# Patient Record
Sex: MALE | URBAN - METROPOLITAN AREA
[De-identification: names, ages, dates, MRNs, and addresses within clinical notes are randomized per-mention and may not be internally consistent; named-entity substitution may affect disease eponyms.]

---

## 2020-06-02 ENCOUNTER — LAB OUTSIDE AN ENCOUNTER (OUTPATIENT)
Dept: URBAN - METROPOLITAN AREA CLINIC 105 | Facility: CLINIC | Age: 60
End: 2020-06-02

## 2020-06-18 LAB
C. DIFFICILE TOXIN A/B, STOOL - QDX: NEGATIVE
GASTROINTESTINAL PATHOGEN: (no result)

## 2020-06-26 ENCOUNTER — LAB OUTSIDE AN ENCOUNTER (OUTPATIENT)
Dept: URBAN - METROPOLITAN AREA CLINIC 105 | Facility: CLINIC | Age: 60
End: 2020-06-26

## 2020-06-26 ENCOUNTER — OFFICE VISIT (OUTPATIENT)
Dept: URBAN - METROPOLITAN AREA CLINIC 105 | Facility: CLINIC | Age: 60
End: 2020-06-26
Payer: COMMERCIAL

## 2020-06-26 DIAGNOSIS — A04.72 C. DIFFICILE COLITIS: ICD-10-CM

## 2020-06-26 DIAGNOSIS — D64.9 ANEMIA: ICD-10-CM

## 2020-06-26 DIAGNOSIS — E03.9 HYPOTHYROID: ICD-10-CM

## 2020-06-26 DIAGNOSIS — Z87.19 HISTORY OF PANCREATITIS: ICD-10-CM

## 2020-06-26 DIAGNOSIS — E46 PROTEIN CALORIE MALNUTRITION: ICD-10-CM

## 2020-06-26 PROCEDURE — 82607 VITAMIN B-12: CPT | Performed by: INTERNAL MEDICINE

## 2020-06-26 PROCEDURE — G8427 DOCREV CUR MEDS BY ELIG CLIN: HCPCS | Performed by: INTERNAL MEDICINE

## 2020-06-26 PROCEDURE — 3017F COLORECTAL CA SCREEN DOC REV: CPT | Performed by: INTERNAL MEDICINE

## 2020-06-26 PROCEDURE — G8420 CALC BMI NORM PARAMETERS: HCPCS | Performed by: INTERNAL MEDICINE

## 2020-06-26 PROCEDURE — G9903 PT SCRN TBCO ID AS NON USER: HCPCS | Performed by: INTERNAL MEDICINE

## 2020-06-26 PROCEDURE — 99214 OFFICE O/P EST MOD 30 MIN: CPT | Performed by: INTERNAL MEDICINE

## 2020-06-26 RX ORDER — FERROUS FUMARATE 324(106)MG
TAKE 1 TABLET BY ORAL ROUTE 3 TIMES A DAY TABLET ORAL
Qty: 0 | Refills: 0 | Status: ACTIVE | COMMUNITY
Start: 1900-01-01

## 2020-06-26 RX ORDER — ASPIRIN 325 MG/1
TAKE 1 TABLET (325 MG) BY ORAL ROUTE ONCE DAILY TABLET ORAL 1
Qty: 0 | Refills: 0 | Status: ACTIVE | COMMUNITY
Start: 1900-01-01

## 2020-06-26 RX ORDER — INSULIN ASPART 100 [IU]/ML
AS DIRECTED INJECTION, SOLUTION INTRAVENOUS; SUBCUTANEOUS
Status: ACTIVE | COMMUNITY

## 2020-06-26 RX ORDER — METFORMIN HYDROCHLORIDE 500 MG/1
TAKE 1 TABLET (500 MG) BY ORAL ROUTE 2 TIMES PER DAY WITH MORNING AND EVENING MEALS TABLET, COATED ORAL 2
Qty: 0 | Refills: 0 | Status: ACTIVE | COMMUNITY
Start: 1900-01-01

## 2020-06-26 RX ORDER — TAMSULOSIN HYDROCHLORIDE 0.4 MG/1
CAPSULE ORAL
Qty: 0 | Refills: 0 | Status: ACTIVE | COMMUNITY
Start: 1900-01-01

## 2020-06-26 RX ORDER — ALBUTEROL SULFATE 0.63 MG/3ML
USE IN NEBULIZER AS DIRECTED SOLUTION INTRABRONCHIAL
Qty: 1 | Refills: 0 | Status: ACTIVE | COMMUNITY
Start: 1900-01-01

## 2020-06-26 RX ORDER — PRAVASTATIN SODIUM 20 MG/1
TAKE 1 TABLET (20 MG) BY ORAL ROUTE ONCE DAILY TABLET ORAL 1
Qty: 0 | Refills: 0 | Status: DISCONTINUED | COMMUNITY
Start: 1900-01-01

## 2020-06-26 RX ORDER — CHLORDIAZEPOXIDE HCL 10 MG
TAKE 1 CAPSULE (10 MG) BY ORAL ROUTE 3 TIMES PER DAY CAPSULE ORAL
Qty: 0 | Refills: 0 | Status: ACTIVE | COMMUNITY
Start: 1900-01-01

## 2020-06-26 RX ORDER — VENLAFAXINE HYDROCHLORIDE 100 MG/1
1 TABLET WITH FOOD TABLET ORAL ONCE A DAY
Status: ACTIVE | COMMUNITY

## 2020-06-26 RX ORDER — ATORVASTATIN CALCIUM 20 MG/1
1 TABLET TABLET, FILM COATED ORAL ONCE A DAY
Status: ACTIVE | COMMUNITY

## 2020-06-26 RX ORDER — CHOLECALCIFEROL (VITAMIN D3) 1250 MCG
1 CAPSULE CAPSULE ORAL
Status: ACTIVE | COMMUNITY

## 2020-06-26 RX ORDER — TRAZODONE HYDROCHLORIDE 100 MG/1
1 TABLET AT BEDTIME TABLET, FILM COATED ORAL ONCE A DAY
Status: ACTIVE | COMMUNITY

## 2020-06-26 RX ORDER — LISINOPRIL 20 MG/1
TAKE 1 TABLET (20 MG) BY ORAL ROUTE ONCE DAILY TABLET ORAL 1
Qty: 0 | Refills: 0 | Status: ACTIVE | COMMUNITY
Start: 1900-01-01

## 2020-06-26 RX ORDER — MIRTAZAPINE 7.5 MG/1
2 TABLETS AT BEDTIME TABLET, FILM COATED ORAL ONCE A DAY
Status: ACTIVE | COMMUNITY

## 2020-06-26 RX ORDER — ABACAVIR SULFATE, LAMIVUDINE, AND ZIDOVUDINE 300; 150; 300 MG/1; MG/1; MG/1
TAKE 1 TABLET BY ORAL ROUTE 2 TIMES PER DAY TABLET, FILM COATED ORAL 2
Qty: 0 | Refills: 0 | Status: ACTIVE | COMMUNITY
Start: 1900-01-01

## 2020-06-26 RX ORDER — LEVOTHYROXINE SODIUM 0.2 MG/1
TAKE 1 TABLET (200 MCG) BY ORAL ROUTE ONCE DAILY TABLET ORAL 1
Qty: 0 | Refills: 0 | Status: DISCONTINUED | COMMUNITY
Start: 1900-01-01

## 2020-06-26 RX ORDER — OXYCODONE HYDROCHLORIDE 10 MG/1
1 TABLET AS NEEDED TABLET ORAL
Status: ACTIVE | COMMUNITY

## 2020-06-26 RX ORDER — SACCHAROMYCES BOULARDII 250 MG
1 CAPSULE CAPSULE ORAL TWICE A DAY
Status: ACTIVE | COMMUNITY

## 2020-06-26 RX ORDER — SIMETHICONE 80 MG/1
1 TABLET AFTER MEALS AND AT BEDTIME AS NEEDED TABLET, CHEWABLE ORAL
Status: ACTIVE | COMMUNITY

## 2020-06-26 RX ORDER — INSULIN LISPRO 100 [IU]/ML
INJECT 10 UNITS BY SUBCUTANEOUS ROUTE INJECTION, SOLUTION INTRAVENOUS; SUBCUTANEOUS 1
Qty: 1 | Refills: 0 | Status: DISCONTINUED | COMMUNITY
Start: 1900-01-01

## 2020-06-26 RX ORDER — INSULIN GLARGINE 100 [IU]/ML
INJECTION, SOLUTION SUBCUTANEOUS
Qty: 0 | Refills: 0 | Status: DISCONTINUED | COMMUNITY
Start: 1900-01-01

## 2020-06-26 RX ORDER — DIPHENHYDRAMINE HYDROCHLORIDE 25 MG/1
TAKE 2 AT BEDTIME TABLET, FILM COATED ORAL
Qty: 0 | Refills: 0 | Status: ACTIVE | COMMUNITY
Start: 1900-01-01

## 2020-06-26 RX ORDER — MAG HYDROX/ALUMINUM HYD/SIMETH 400-400-40
10 ML AS NEEDED SUSPENSION, ORAL (FINAL DOSE FORM) ORAL
Status: ACTIVE | COMMUNITY

## 2020-06-26 RX ORDER — AMLODIPINE BESYLATE 5 MG/1
1 TABLET TABLET ORAL ONCE A DAY
Status: DISCONTINUED | COMMUNITY

## 2020-06-26 RX ORDER — INSULIN DETEMIR 100 [IU]/ML
AS DIRECTED INJECTION, SOLUTION SUBCUTANEOUS
Status: ACTIVE | COMMUNITY

## 2020-06-26 RX ORDER — GLIMEPIRIDE 4 MG/1
1 TABLET WITH BREAKFAST OR THE FIRST MAIN MEAL OF THE DAY TABLET ORAL ONCE A DAY
Status: ACTIVE | COMMUNITY

## 2020-06-26 RX ORDER — IBUPROFEN 600 MG/1
1 TABLET WITH FOOD OR MILK AS NEEDED TABLET ORAL THREE TIMES A DAY
Status: ACTIVE | COMMUNITY

## 2020-06-26 RX ORDER — PROMETHAZINE HYDROCHLORIDE 12.5 MG/1
1 TABLET AS NEEDED TABLET ORAL
Qty: 90 TABLET | Status: ACTIVE | COMMUNITY

## 2020-06-26 RX ORDER — LEVOTHYROXINE SODIUM 137 UG/1
1 TABLET IN THE MORNING ON AN EMPTY STOMACH TABLET ORAL ONCE A DAY
Status: ACTIVE | COMMUNITY

## 2020-06-26 RX ORDER — LAMIVUDINE AND ZIDOVUDINE 150; 300 MG/1; MG/1
AS DIRECTED TABLET, FILM COATED ORAL
Status: ACTIVE | COMMUNITY

## 2020-06-26 RX ORDER — GABAPENTIN 300 MG/1
TAKE 1 CAPSULE (300 MG) BY ORAL ROUTE 3 TIMES PER DAY CAPSULE ORAL
Qty: 0 | Refills: 0 | Status: ACTIVE | COMMUNITY
Start: 1900-01-01

## 2020-06-26 RX ORDER — HYDROXYZINE PAMOATE 25 MG/1
TAKE 1 CAPSULE BY ORAL ROUTE ONCE A DAY (AT BEDTIME) CAPSULE ORAL 1
Qty: 0 | Refills: 0 | Status: DISCONTINUED | COMMUNITY
Start: 1900-01-01

## 2020-06-26 RX ORDER — ABACAVIR SULFATE 300 MG/1
1 TABLET TABLET, FILM COATED ORAL TWICE A DAY
Status: ACTIVE | COMMUNITY

## 2020-06-26 NOTE — HPI-OTHER HISTORIES
The patient presents in follow-up after presenting 3/3/20 with left-sided abdominal pain and diarrhea w/ fecal incontinence. He was diagnosed with C diff colitis and tx'ed with vancomycin x 2 weeks. He was suppose to follow-up later in March, but I am seeing him for the first time today in follow-up.  On 3/3/20, the patient presented for a "colon infection." Associated symptoms were left-sided abdominal pain and diarrhea with fecal incontinence. Onset of diarrhea was 3-6 months ago. He had 3-5 BMs/day. They were watery unless he took a "stool hardener" 2 pills QHS. He denied bleeding and weight loss.   His last colon was 20 years ago, he stated. He also reported a kidney infection. He noted seeing Dr. Jhon Troy at [facility]. His HIV doctor was George Ko at [facility] (next appt was on Wed).   Physician Consult Note from [facility] 6/26/19 - note no diarrhea.  He had a history of alcohol indued pancreatitis/partial pancreatectomy and former polysubstance abuse.  3/4/20 GPP stool study positive for C diff by Toxin A/B gene and EIA Toxin A/B negative.  He was treated with vancomycin 125 mg QID x 2 weeks and he notes his diarrhea resolved.  On 5/12/20, he stated he had recurrent diarrhea starting 1 week ago.  He was having 2 watery BMs/day.  Today, he says he has 2-3 hard or watery BMs/day without straining. The vancomycin 125 mg has significantly improved his bowel habit. He no longer has incontinence. He reports intermittent LE edema. He also reports excessive sweating.   Labs 6/2/20 - Stool study positive for C. diff, neg for C. dif a/b EIA. 5/28/20 - CBC normal except hgb 8.1, . CMP, iron/TIBC, ferritin, TSH/FT4, vit B12, folate all normal. 3/2/20 - TSH 52.6/elevated, FT4 0.70 LLN 0.82, WBC 15, Hgb 8.3, MCV 97/normal, Creat 1.76, K 6.0, Albumin 3.0, ttG negative, lipase 3, IgA normal.

## 2020-06-29 LAB
A/G RATIO: 0.9
ALBUMIN: 3.2
ALKALINE PHOSPHATASE: 80
ALT (SGPT): 27
AST (SGOT): 45
BASO (ABSOLUTE): 0
BASOS: 0
BILIRUBIN, TOTAL: <0.2
BUN/CREATININE RATIO: 5
BUN: 9
CALCIUM: 8.2
CARBON DIOXIDE, TOTAL: 23
CHLORIDE: 100
CREATININE: 1.75
EGFR IF AFRICN AM: 48
EGFR IF NONAFRICN AM: 41
EOS (ABSOLUTE): 0.3
EOS: 4
FERRITIN, SERUM: 440
FOLATE (FOLIC ACID), SERUM: >20
GLOBULIN, TOTAL: 3.7
GLUCOSE: 82
HEMATOCRIT: 24.8
HEMATOLOGY COMMENTS:: (no result)
HEMOGLOBIN: 8.3
IMMATURE CELLS: (no result)
IMMATURE GRANS (ABS): 0
IMMATURE GRANULOCYTES: 0
IRON BIND.CAP.(TIBC): 229
IRON SATURATION: 29
IRON: 66
LYMPHS (ABSOLUTE): 2.9
LYMPHS: 42
MCH: 33.2
MCHC: 33.5
MCV: 99
MONOCYTES(ABSOLUTE): 0.6
MONOCYTES: 9
NEUTROPHILS (ABSOLUTE): 3.1
NEUTROPHILS: 45
NRBC: (no result)
PLATELETS: 451
POTASSIUM: 5.3
PROTEIN, TOTAL: 6.9
RBC: 2.5
RDW: 15.5
SODIUM: 134
T4,FREE(DIRECT): 0.83
TSH: 105
UIBC: 163
VITAMIN B12: 474
WBC: 6.9

## 2020-07-07 ENCOUNTER — OUT OF OFFICE VISIT (OUTPATIENT)
Dept: URBAN - METROPOLITAN AREA MEDICAL CENTER 28 | Facility: MEDICAL CENTER | Age: 60
End: 2020-07-07
Payer: COMMERCIAL

## 2020-07-07 DIAGNOSIS — Z87.19 H/O SMALL BOWEL OBSTRUCTION: ICD-10-CM

## 2020-07-07 DIAGNOSIS — E11.9 ALTERED GLUCOSE METABOLISM DUE TO DIABETES: ICD-10-CM

## 2020-07-07 DIAGNOSIS — Z21 HIV POSITIVE: ICD-10-CM

## 2020-07-07 DIAGNOSIS — K62.5 ANAL BLEEDING: ICD-10-CM

## 2020-07-07 PROCEDURE — 99214 OFFICE O/P EST MOD 30 MIN: CPT | Performed by: PHYSICIAN ASSISTANT

## 2020-07-09 ENCOUNTER — OUT OF OFFICE VISIT (OUTPATIENT)
Dept: URBAN - METROPOLITAN AREA MEDICAL CENTER 28 | Facility: MEDICAL CENTER | Age: 60
End: 2020-07-09
Payer: COMMERCIAL

## 2020-07-09 ENCOUNTER — OUT OF OFFICE VISIT (OUTPATIENT)
Dept: URBAN - METROPOLITAN AREA MEDICAL CENTER 28 | Facility: MEDICAL CENTER | Age: 60
End: 2020-07-09

## 2020-07-09 DIAGNOSIS — D50.8 IRON DEFICIENCY ANEMIA DUE TO DIETARY CAUSES: ICD-10-CM

## 2020-07-09 DIAGNOSIS — K62.5 ANAL BLEEDING: ICD-10-CM

## 2020-07-09 PROCEDURE — G9937 DIG OR SURV COLSCO: HCPCS | Performed by: INTERNAL MEDICINE

## 2020-07-09 PROCEDURE — 45378 DIAGNOSTIC COLONOSCOPY: CPT | Performed by: INTERNAL MEDICINE

## 2020-07-09 PROCEDURE — 992 APS NON BILLABLE: Performed by: INTERNAL MEDICINE

## 2020-07-23 ENCOUNTER — OFFICE VISIT (OUTPATIENT)
Dept: URBAN - METROPOLITAN AREA CLINIC 105 | Facility: CLINIC | Age: 60
End: 2020-07-23
Payer: COMMERCIAL

## 2020-07-23 DIAGNOSIS — N18.9 CHRONIC KIDNEY DISEASE, UNSPECIFIED CKD STAGE: ICD-10-CM

## 2020-07-23 DIAGNOSIS — E03.9 HYPOTHYROID: ICD-10-CM

## 2020-07-23 DIAGNOSIS — E46 PROTEIN CALORIE MALNUTRITION: ICD-10-CM

## 2020-07-23 DIAGNOSIS — A04.72 C. DIFFICILE COLITIS: ICD-10-CM

## 2020-07-23 DIAGNOSIS — D64.9 ANEMIA: ICD-10-CM

## 2020-07-23 DIAGNOSIS — Z87.19 HISTORY OF PANCREATITIS: ICD-10-CM

## 2020-07-23 PROCEDURE — G9902 PT SCRN TBCO AND ID AS USER: HCPCS | Performed by: INTERNAL MEDICINE

## 2020-07-23 PROCEDURE — G8420 CALC BMI NORM PARAMETERS: HCPCS | Performed by: INTERNAL MEDICINE

## 2020-07-23 PROCEDURE — G8427 DOCREV CUR MEDS BY ELIG CLIN: HCPCS | Performed by: INTERNAL MEDICINE

## 2020-07-23 PROCEDURE — 99213 OFFICE O/P EST LOW 20 MIN: CPT | Performed by: INTERNAL MEDICINE

## 2020-07-23 PROCEDURE — 3017F COLORECTAL CA SCREEN DOC REV: CPT | Performed by: INTERNAL MEDICINE

## 2020-07-23 RX ORDER — SACCHAROMYCES BOULARDII 250 MG
1 CAPSULE CAPSULE ORAL TWICE A DAY
Status: ACTIVE | COMMUNITY

## 2020-07-23 RX ORDER — IBUPROFEN 600 MG/1
1 TABLET WITH FOOD OR MILK AS NEEDED TABLET ORAL THREE TIMES A DAY
Status: ACTIVE | COMMUNITY

## 2020-07-23 RX ORDER — DIPHENHYDRAMINE HYDROCHLORIDE 25 MG/1
TAKE 2 AT BEDTIME TABLET, FILM COATED ORAL
Qty: 0 | Refills: 0 | Status: ACTIVE | COMMUNITY
Start: 1900-01-01

## 2020-07-23 RX ORDER — ABACAVIR SULFATE 300 MG/1
1 TABLET TABLET, FILM COATED ORAL TWICE A DAY
Status: ACTIVE | COMMUNITY

## 2020-07-23 RX ORDER — CHLORDIAZEPOXIDE HCL 10 MG
TAKE 1 CAPSULE (10 MG) BY ORAL ROUTE 3 TIMES PER DAY CAPSULE ORAL
Qty: 0 | Refills: 0 | Status: ACTIVE | COMMUNITY
Start: 1900-01-01

## 2020-07-23 RX ORDER — VENLAFAXINE HYDROCHLORIDE 100 MG/1
1 TABLET WITH FOOD TABLET ORAL ONCE A DAY
Status: ACTIVE | COMMUNITY

## 2020-07-23 RX ORDER — TRAZODONE HYDROCHLORIDE 100 MG/1
1 TABLET AT BEDTIME TABLET, FILM COATED ORAL ONCE A DAY
Status: ACTIVE | COMMUNITY

## 2020-07-23 RX ORDER — LEVOTHYROXINE SODIUM 137 UG/1
1 TABLET IN THE MORNING ON AN EMPTY STOMACH TABLET ORAL ONCE A DAY
Status: ACTIVE | COMMUNITY

## 2020-07-23 RX ORDER — CHOLECALCIFEROL (VITAMIN D3) 1250 MCG
1 CAPSULE CAPSULE ORAL
Status: ACTIVE | COMMUNITY

## 2020-07-23 RX ORDER — METFORMIN HYDROCHLORIDE 500 MG/1
TAKE 1 TABLET (500 MG) BY ORAL ROUTE 2 TIMES PER DAY WITH MORNING AND EVENING MEALS TABLET, COATED ORAL 2
Qty: 0 | Refills: 0 | Status: ACTIVE | COMMUNITY
Start: 1900-01-01

## 2020-07-23 RX ORDER — TAMSULOSIN HYDROCHLORIDE 0.4 MG/1
CAPSULE ORAL
Qty: 0 | Refills: 0 | Status: ACTIVE | COMMUNITY
Start: 1900-01-01

## 2020-07-23 RX ORDER — ALBUTEROL SULFATE 0.63 MG/3ML
USE IN NEBULIZER AS DIRECTED SOLUTION INTRABRONCHIAL
Qty: 1 | Refills: 0 | Status: ACTIVE | COMMUNITY
Start: 1900-01-01

## 2020-07-23 RX ORDER — GLIMEPIRIDE 4 MG/1
1 TABLET WITH BREAKFAST OR THE FIRST MAIN MEAL OF THE DAY TABLET ORAL ONCE A DAY
Status: ACTIVE | COMMUNITY

## 2020-07-23 RX ORDER — LISINOPRIL 20 MG/1
TAKE 1 TABLET (20 MG) BY ORAL ROUTE ONCE DAILY TABLET ORAL 1
Qty: 0 | Refills: 0 | Status: ACTIVE | COMMUNITY
Start: 1900-01-01

## 2020-07-23 RX ORDER — LAMIVUDINE AND ZIDOVUDINE 150; 300 MG/1; MG/1
AS DIRECTED TABLET, FILM COATED ORAL
Status: ACTIVE | COMMUNITY

## 2020-07-23 RX ORDER — PROMETHAZINE HYDROCHLORIDE 12.5 MG/1
1 TABLET AS NEEDED TABLET ORAL
Qty: 90 TABLET | Status: ACTIVE | COMMUNITY

## 2020-07-23 RX ORDER — SIMETHICONE 80 MG/1
1 TABLET AFTER MEALS AND AT BEDTIME AS NEEDED TABLET, CHEWABLE ORAL
Status: ACTIVE | COMMUNITY

## 2020-07-23 RX ORDER — MAG HYDROX/ALUMINUM HYD/SIMETH 400-400-40
10 ML AS NEEDED SUSPENSION, ORAL (FINAL DOSE FORM) ORAL
Status: ACTIVE | COMMUNITY

## 2020-07-23 RX ORDER — INSULIN ASPART 100 [IU]/ML
AS DIRECTED INJECTION, SOLUTION INTRAVENOUS; SUBCUTANEOUS
Status: ACTIVE | COMMUNITY

## 2020-07-23 RX ORDER — GABAPENTIN 300 MG/1
TAKE 1 CAPSULE (300 MG) BY ORAL ROUTE 3 TIMES PER DAY CAPSULE ORAL
Qty: 0 | Refills: 0 | Status: ACTIVE | COMMUNITY
Start: 1900-01-01

## 2020-07-23 RX ORDER — ABACAVIR SULFATE, LAMIVUDINE, AND ZIDOVUDINE 300; 150; 300 MG/1; MG/1; MG/1
TAKE 1 TABLET BY ORAL ROUTE 2 TIMES PER DAY TABLET, FILM COATED ORAL 2
Qty: 0 | Refills: 0 | Status: ACTIVE | COMMUNITY
Start: 1900-01-01

## 2020-07-23 RX ORDER — MIRTAZAPINE 7.5 MG/1
2 TABLETS AT BEDTIME TABLET, FILM COATED ORAL ONCE A DAY
Status: ACTIVE | COMMUNITY

## 2020-07-23 RX ORDER — OXYCODONE HYDROCHLORIDE 10 MG/1
1 TABLET AS NEEDED TABLET ORAL
Status: ACTIVE | COMMUNITY

## 2020-07-23 RX ORDER — ASPIRIN 325 MG/1
TAKE 1 TABLET (325 MG) BY ORAL ROUTE ONCE DAILY TABLET ORAL 1
Qty: 0 | Refills: 0 | Status: ACTIVE | COMMUNITY
Start: 1900-01-01

## 2020-07-23 RX ORDER — INSULIN DETEMIR 100 [IU]/ML
AS DIRECTED INJECTION, SOLUTION SUBCUTANEOUS
Status: ACTIVE | COMMUNITY

## 2020-07-23 RX ORDER — FERROUS FUMARATE 324(106)MG
TAKE 1 TABLET BY ORAL ROUTE 3 TIMES A DAY TABLET ORAL
Qty: 0 | Refills: 0 | Status: ACTIVE | COMMUNITY
Start: 1900-01-01

## 2020-07-23 RX ORDER — ATORVASTATIN CALCIUM 20 MG/1
1 TABLET TABLET, FILM COATED ORAL ONCE A DAY
Status: ACTIVE | COMMUNITY

## 2020-07-23 NOTE — HPI-OTHER HISTORIES
The patient presents in follow-up after presenting 3/3/20 with left-sided abdominal pain and diarrhea w/ fecal incontinence. He was diagnosed with C diff colitis and tx'ed with vancomycin x 2 weeks. He was suppose to follow-up later in March, but I am seeing him for the first time today in follow-up.  On 3/3/20, the patient presented for a "colon infection." Associated symptoms were left-sided abdominal pain and diarrhea with fecal incontinence. Onset of diarrhea was 3-6 months ago. He had 3-5 BMs/day. They were watery unless he took a "stool hardener" 2 pills QHS. He denied bleeding and weight loss.   His last colon was 20 years ago, he stated. He also reported a kidney infection. He noted seeing Dr. Jhon Troy at [facility]. His HIV doctor was George Ko at [facility] (next appt was on Wed).   Physician Consult Note from [facility] 6/26/19 - note no diarrhea.  He had a history of alcohol indued pancreatitis/partial pancreatectomy and former polysubstance abuse.  3/4/20 GPP stool study positive for C diff by Toxin A/B gene and EIA Toxin A/B negative.  He was treated with vancomycin 125 mg QID x 2 weeks and he notes his diarrhea resolved.  On 5/12/20, he stated he had recurrent diarrhea starting 1 week ago.  He was having 2 watery BMs/day.  On 6/26/20, he said he had 2-3 hard or watery BMs/day without straining. The vancomycin 125 mg had significantly improved his bowel habit. He no longer had incontinence. He reported intermittent LE edema. He also reported excessive sweating.   Today, he says he has 2 BMs/day without blood. A colon was done at [facility] he states while he was a recent inpatient. It was normal he states. He continues on vancomycin 125 mg QD. He continues on Creon with meals and snacks.   ADDENDUM 7/24/20:  [facility] D/C summary from admit from 7/6/20-7/10/20 reviewed.  Admitted with BRBPR and iron deficiency anemia.  B12 and folic acid normal.  Hgb 7.1 to 8.4 on admit and increased to 10.9 with 1 unit blood.  Continued on iron sulfate.  7/6/20 CT A/P w/o contrast - right lower lung opacities - inflammatory or infectious process.  Moderate stool in the colon.  Mild bladder wall thickening.  Extensive anasarca.  CXR 7/6/20 with right lung base atelectasis.  Trace left pleural effusion.  7/9/20 Colonoscopy by Dr. Parry - ascending DD, brown stool in the entire colon - fair prep.  No other finding.  Unable to retroflex in the vault - too small.  Labs 6/26/20 - CBC normal except hgb 8.3, MCV 99, platelets 451. CMP normal except creatinine 1.75, potassium 5.3, albumin 3.2, AST 45, GFR 48. Ferritin 440, TIBC 229, . Folate, vit B12 all normal. 6/2/20 - Stool study positive for C. diff, neg for C. dif a/b EIA. 5/28/20 - CBC normal except hgb 8.1, . CMP, iron/TIBC, ferritin, TSH/FT4, vit B12, folate all normal. 3/2/20 - TSH 52.6/elevated, FT4 0.70 LLN 0.82, WBC 15, Hgb 8.3, MCV 97/normal, Creat 1.76, K 6.0, Albumin 3.0, ttG negative, lipase 3, IgA normal.

## 2020-08-20 ENCOUNTER — LAB OUTSIDE AN ENCOUNTER (OUTPATIENT)
Dept: URBAN - METROPOLITAN AREA CLINIC 105 | Facility: CLINIC | Age: 60
End: 2020-08-20

## 2020-08-20 ENCOUNTER — OFFICE VISIT (OUTPATIENT)
Dept: URBAN - METROPOLITAN AREA CLINIC 105 | Facility: CLINIC | Age: 60
End: 2020-08-20
Payer: COMMERCIAL

## 2020-08-20 DIAGNOSIS — E46 PROTEIN CALORIE MALNUTRITION: ICD-10-CM

## 2020-08-20 DIAGNOSIS — A04.72 C. DIFFICILE COLITIS: ICD-10-CM

## 2020-08-20 DIAGNOSIS — D64.9 ANEMIA: ICD-10-CM

## 2020-08-20 DIAGNOSIS — N18.9 CHRONIC KIDNEY DISEASE, UNSPECIFIED CKD STAGE: ICD-10-CM

## 2020-08-20 DIAGNOSIS — E03.9 HYPOTHYROID: ICD-10-CM

## 2020-08-20 DIAGNOSIS — Z87.19 HISTORY OF PANCREATITIS: ICD-10-CM

## 2020-08-20 PROCEDURE — 3017F COLORECTAL CA SCREEN DOC REV: CPT | Performed by: INTERNAL MEDICINE

## 2020-08-20 PROCEDURE — G8427 DOCREV CUR MEDS BY ELIG CLIN: HCPCS | Performed by: INTERNAL MEDICINE

## 2020-08-20 PROCEDURE — G9903 PT SCRN TBCO ID AS NON USER: HCPCS | Performed by: INTERNAL MEDICINE

## 2020-08-20 PROCEDURE — 99214 OFFICE O/P EST MOD 30 MIN: CPT | Performed by: INTERNAL MEDICINE

## 2020-08-20 PROCEDURE — G8420 CALC BMI NORM PARAMETERS: HCPCS | Performed by: INTERNAL MEDICINE

## 2020-08-20 RX ORDER — LEVOTHYROXINE SODIUM 137 UG/1
1 TABLET IN THE MORNING ON AN EMPTY STOMACH TABLET ORAL ONCE A DAY
Status: ACTIVE | COMMUNITY

## 2020-08-20 RX ORDER — PROMETHAZINE HYDROCHLORIDE 12.5 MG/1
1 TABLET AS NEEDED TABLET ORAL
Qty: 90 TABLET | Status: ACTIVE | COMMUNITY

## 2020-08-20 RX ORDER — VENLAFAXINE HYDROCHLORIDE 100 MG/1
1 TABLET WITH FOOD TABLET ORAL ONCE A DAY
Status: ACTIVE | COMMUNITY

## 2020-08-20 RX ORDER — MIRTAZAPINE 7.5 MG/1
2 TABLETS AT BEDTIME TABLET, FILM COATED ORAL ONCE A DAY
Status: ACTIVE | COMMUNITY

## 2020-08-20 RX ORDER — METFORMIN HYDROCHLORIDE 500 MG/1
TAKE 1 TABLET (500 MG) BY ORAL ROUTE 2 TIMES PER DAY WITH MORNING AND EVENING MEALS TABLET, COATED ORAL 2
Qty: 0 | Refills: 0 | Status: DISCONTINUED | COMMUNITY
Start: 1900-01-01

## 2020-08-20 RX ORDER — ALBUTEROL SULFATE 0.63 MG/3ML
USE IN NEBULIZER AS DIRECTED SOLUTION INTRABRONCHIAL
Qty: 1 | Refills: 0 | Status: DISCONTINUED | COMMUNITY
Start: 1900-01-01

## 2020-08-20 RX ORDER — SIMETHICONE 80 MG/1
1 TABLET AFTER MEALS AND AT BEDTIME AS NEEDED TABLET, CHEWABLE ORAL
Status: ACTIVE | COMMUNITY

## 2020-08-20 RX ORDER — ABACAVIR SULFATE 300 MG/1
1 TABLET TABLET, FILM COATED ORAL TWICE A DAY
Status: DISCONTINUED | COMMUNITY

## 2020-08-20 RX ORDER — TRAZODONE HYDROCHLORIDE 100 MG/1
1 TABLET AT BEDTIME TABLET, FILM COATED ORAL ONCE A DAY
Status: ACTIVE | COMMUNITY

## 2020-08-20 RX ORDER — DIPHENHYDRAMINE HYDROCHLORIDE 25 MG/1
TAKE 2 AT BEDTIME TABLET, FILM COATED ORAL
Qty: 0 | Refills: 0 | Status: DISCONTINUED | COMMUNITY
Start: 1900-01-01

## 2020-08-20 RX ORDER — INSULIN ASPART 100 [IU]/ML
AS DIRECTED INJECTION, SOLUTION INTRAVENOUS; SUBCUTANEOUS
Status: ACTIVE | COMMUNITY

## 2020-08-20 RX ORDER — ASPIRIN 325 MG/1
TAKE 1 TABLET (325 MG) BY ORAL ROUTE ONCE DAILY TABLET ORAL 1
Qty: 0 | Refills: 0 | Status: DISCONTINUED | COMMUNITY
Start: 1900-01-01

## 2020-08-20 RX ORDER — TAMSULOSIN HYDROCHLORIDE 0.4 MG/1
CAPSULE ORAL
Qty: 0 | Refills: 0 | Status: ACTIVE | COMMUNITY
Start: 1900-01-01

## 2020-08-20 RX ORDER — OXYCODONE HYDROCHLORIDE 10 MG/1
1 TABLET AS NEEDED TABLET ORAL
Status: ACTIVE | COMMUNITY

## 2020-08-20 RX ORDER — SACCHAROMYCES BOULARDII 250 MG
1 CAPSULE CAPSULE ORAL TWICE A DAY
Status: DISCONTINUED | COMMUNITY

## 2020-08-20 RX ORDER — LAMIVUDINE AND ZIDOVUDINE 150; 300 MG/1; MG/1
AS DIRECTED TABLET, FILM COATED ORAL
Status: ACTIVE | COMMUNITY

## 2020-08-20 RX ORDER — GLIMEPIRIDE 4 MG/1
1 TABLET WITH BREAKFAST OR THE FIRST MAIN MEAL OF THE DAY TABLET ORAL ONCE A DAY
Status: DISCONTINUED | COMMUNITY

## 2020-08-20 RX ORDER — LISINOPRIL 20 MG/1
TAKE 1 TABLET (20 MG) BY ORAL ROUTE ONCE DAILY TABLET ORAL 1
Qty: 0 | Refills: 0 | Status: ACTIVE | COMMUNITY
Start: 1900-01-01

## 2020-08-20 RX ORDER — IBUPROFEN 600 MG/1
1 TABLET WITH FOOD OR MILK AS NEEDED TABLET ORAL THREE TIMES A DAY
Status: DISCONTINUED | COMMUNITY

## 2020-08-20 RX ORDER — MAG HYDROX/ALUMINUM HYD/SIMETH 400-400-40
10 ML AS NEEDED SUSPENSION, ORAL (FINAL DOSE FORM) ORAL
Status: DISCONTINUED | COMMUNITY

## 2020-08-20 RX ORDER — CHLORDIAZEPOXIDE HCL 10 MG
TAKE 1 CAPSULE (10 MG) BY ORAL ROUTE 3 TIMES PER DAY CAPSULE ORAL
Qty: 0 | Refills: 0 | Status: ACTIVE | COMMUNITY
Start: 1900-01-01

## 2020-08-20 RX ORDER — FERROUS FUMARATE 324(106)MG
TAKE 1 TABLET BY ORAL ROUTE 3 TIMES A DAY TABLET ORAL
Qty: 0 | Refills: 0 | Status: ACTIVE | COMMUNITY
Start: 1900-01-01

## 2020-08-20 RX ORDER — INSULIN DETEMIR 100 [IU]/ML
AS DIRECTED INJECTION, SOLUTION SUBCUTANEOUS
Status: ACTIVE | COMMUNITY

## 2020-08-20 RX ORDER — CHOLECALCIFEROL (VITAMIN D3) 1250 MCG
1 CAPSULE CAPSULE ORAL
Status: ACTIVE | COMMUNITY

## 2020-08-20 RX ORDER — ATORVASTATIN CALCIUM 20 MG/1
1 TABLET TABLET, FILM COATED ORAL ONCE A DAY
Status: ACTIVE | COMMUNITY

## 2020-08-20 RX ORDER — ABACAVIR SULFATE, LAMIVUDINE, AND ZIDOVUDINE 300; 150; 300 MG/1; MG/1; MG/1
TAKE 1 TABLET BY ORAL ROUTE 2 TIMES PER DAY TABLET, FILM COATED ORAL 2
Qty: 0 | Refills: 0 | Status: ACTIVE | COMMUNITY
Start: 1900-01-01

## 2020-08-20 RX ORDER — GABAPENTIN 300 MG/1
TAKE 1 CAPSULE (300 MG) BY ORAL ROUTE 3 TIMES PER DAY CAPSULE ORAL
Qty: 0 | Refills: 0 | Status: ACTIVE | COMMUNITY
Start: 1900-01-01

## 2020-08-20 NOTE — HPI-OTHER HISTORIES
The patient presents in follow-up after presenting 3/3/20 with left-sided abdominal pain and diarrhea w/ fecal incontinence. He was diagnosed with C diff colitis and tx'ed with vancomycin x 2 weeks. He was suppose to follow-up later in March, but I am seeing him for the first time today in follow-up.  On 3/3/20, the patient presented for a "colon infection." Associated symptoms were left-sided abdominal pain and diarrhea with fecal incontinence. Onset of diarrhea was 3-6 months ago. He had 3-5 BMs/day. They were watery unless he took a "stool hardener" 2 pills QHS. He denied bleeding and weight loss.   His last colon was 20 years ago, he stated. He also reported a kidney infection. He noted seeing Dr. Jhon Troy at [facility]. His HIV doctor was George Ko at [facility] (next appt was on Wed).   Physician Consult Note from [facility] 6/26/19 - note no diarrhea.  He had a history of alcohol indued pancreatitis/partial pancreatectomy and former polysubstance abuse.  3/4/20 GPP stool study positive for C diff by Toxin A/B gene and EIA Toxin A/B negative.  He was treated with vancomycin 125 mg QID x 2 weeks and he notes his diarrhea resolved.  On 5/12/20, he stated he had recurrent diarrhea starting 1 week ago.  He was having 2 watery BMs/day.  On 6/26/20, he said he had 2-3 hard or watery BMs/day without straining. The vancomycin 125 mg had significantly improved his bowel habit. He no longer had incontinence. He reported intermittent LE edema. He also reported excessive sweating.   On 7/23/20, he said he had 2 BMs/day without blood. A colon was done at [facility] he stated while he was a recent inpatient. It was normal he stated. He continued on vancomycin 125 mg QD. He continued on Creon with meals and snacks.   ADDENDUM 7/24/20:  [facility] D/C summary from admit from 7/6/20-7/10/20 reviewed. Admitted with BRBPR and iron deficiency anemia. B12 and folic acid normal. Hgb 7.1 to 8.4 on admit and increased to 10.9 with 1 unit blood. Continued on iron sulfate. CT 7/6/20 A/P w/o contrast - right lower lung opacities - inflammatory or infectious process. Moderate stool in the colon. Mild bladder wall thickening. Extensive anasarca. CXR 7/6/20 with right lung base atelectasis.  Trace left pleural effusion.  7/9/20 Colonoscopy by Dr. Parry - ascending DD, brown stool in the entire colon - fair prep.  No other finding.  Unable to retroflex in the vault - too small.  Today, he says his bowel habit is good. He has 2-3 formed BMs/day. He denies watery diarrhea. He is off vancomycin. He continues on Creon 36k lipase 1 pill 5x/day with food. He continues on oral iron 1 pill QD.  Labs 6/26/20 - CBC normal except hgb 8.3, MCV 99, platelets 451. CMP normal except creatinine 1.75, potassium 5.3, albumin 3.2, AST 45, GFR 48. Ferritin 440, TIBC 229, . Folate, vit B12 all normal. 6/2/20 - Stool study positive for C. diff, neg for C. dif a/b EIA. 5/28/20 - CBC normal except hgb 8.1, . CMP, iron/TIBC, ferritin, TSH/FT4, vit B12, folate all normal. 3/2/20 - TSH 52.6/elevated, FT4 0.70 LLN 0.82, WBC 15, Hgb 8.3, MCV 97/normal, Creat 1.76, K 6.0, Albumin 3.0, ttG negative, lipase 3, IgA normal.

## 2020-08-21 LAB
BASO (ABSOLUTE): 0.1
BASOS: 1
EOS (ABSOLUTE): 0.2
EOS: 2
FERRITIN, SERUM: 500
HEMATOCRIT: 28.1
HEMATOLOGY COMMENTS:: (no result)
HEMOGLOBIN: 9.8
IMMATURE CELLS: (no result)
IMMATURE GRANS (ABS): 0
IMMATURE GRANULOCYTES: 0
IRON BIND.CAP.(TIBC): 211
IRON SATURATION: 34
IRON: 72
LYMPHS (ABSOLUTE): 2.8
LYMPHS: 32
MCH: 32.7
MCHC: 34.9
MCV: 94
MONOCYTES(ABSOLUTE): 0.7
MONOCYTES: 8
NEUTROPHILS (ABSOLUTE): 5
NEUTROPHILS: 57
NRBC: (no result)
PLATELETS: 442
RBC: 3
RDW: 15.5
UIBC: 139
WBC: 8.8

## 2020-12-17 ENCOUNTER — OFFICE VISIT (OUTPATIENT)
Dept: URBAN - METROPOLITAN AREA CLINIC 105 | Facility: CLINIC | Age: 60
End: 2020-12-17
Payer: COMMERCIAL

## 2020-12-17 VITALS
WEIGHT: 163.4 LBS | HEIGHT: 69 IN | DIASTOLIC BLOOD PRESSURE: 68 MMHG | HEART RATE: 88 BPM | BODY MASS INDEX: 24.2 KG/M2 | SYSTOLIC BLOOD PRESSURE: 157 MMHG | TEMPERATURE: 91.9 F

## 2020-12-17 DIAGNOSIS — N18.9 CHRONIC KIDNEY DISEASE, UNSPECIFIED CKD STAGE: ICD-10-CM

## 2020-12-17 DIAGNOSIS — D64.9 ANEMIA: ICD-10-CM

## 2020-12-17 DIAGNOSIS — A04.72 C. DIFFICILE COLITIS: ICD-10-CM

## 2020-12-17 DIAGNOSIS — E03.9 HYPOTHYROID: ICD-10-CM

## 2020-12-17 DIAGNOSIS — R19.7 DIARRHEA: ICD-10-CM

## 2020-12-17 DIAGNOSIS — E46 PROTEIN CALORIE MALNUTRITION: ICD-10-CM

## 2020-12-17 DIAGNOSIS — Z87.19 HISTORY OF PANCREATITIS: ICD-10-CM

## 2020-12-17 PROCEDURE — 3017F COLORECTAL CA SCREEN DOC REV: CPT | Performed by: INTERNAL MEDICINE

## 2020-12-17 PROCEDURE — G8420 CALC BMI NORM PARAMETERS: HCPCS | Performed by: INTERNAL MEDICINE

## 2020-12-17 PROCEDURE — G9903 PT SCRN TBCO ID AS NON USER: HCPCS | Performed by: INTERNAL MEDICINE

## 2020-12-17 PROCEDURE — G8427 DOCREV CUR MEDS BY ELIG CLIN: HCPCS | Performed by: INTERNAL MEDICINE

## 2020-12-17 PROCEDURE — G8483 FLU IMM NO ADMIN DOC REA: HCPCS | Performed by: INTERNAL MEDICINE

## 2020-12-17 PROCEDURE — 99214 OFFICE O/P EST MOD 30 MIN: CPT | Performed by: INTERNAL MEDICINE

## 2020-12-17 RX ORDER — VENLAFAXINE HYDROCHLORIDE 100 MG/1
1 TABLET WITH FOOD TABLET ORAL ONCE A DAY
Status: ACTIVE | COMMUNITY

## 2020-12-17 RX ORDER — TAMSULOSIN HYDROCHLORIDE 0.4 MG/1
CAPSULE ORAL
Qty: 0 | Refills: 0 | Status: ACTIVE | COMMUNITY
Start: 1900-01-01

## 2020-12-17 RX ORDER — PROMETHAZINE HYDROCHLORIDE 12.5 MG/1
1 TABLET AS NEEDED TABLET ORAL
Qty: 90 TABLET | Status: ACTIVE | COMMUNITY

## 2020-12-17 RX ORDER — LISINOPRIL 20 MG/1
TAKE 1 TABLET (20 MG) BY ORAL ROUTE ONCE DAILY TABLET ORAL 1
Qty: 0 | Refills: 0 | Status: ACTIVE | COMMUNITY
Start: 1900-01-01

## 2020-12-17 RX ORDER — CHOLECALCIFEROL (VITAMIN D3) 1250 MCG
1 CAPSULE CAPSULE ORAL
Status: ACTIVE | COMMUNITY

## 2020-12-17 RX ORDER — CHLORDIAZEPOXIDE HCL 10 MG
TAKE 1 CAPSULE (10 MG) BY ORAL ROUTE 3 TIMES PER DAY CAPSULE ORAL
Qty: 0 | Refills: 0 | Status: ACTIVE | COMMUNITY
Start: 1900-01-01

## 2020-12-17 RX ORDER — GABAPENTIN 300 MG/1
TAKE 1 CAPSULE (300 MG) BY ORAL ROUTE 3 TIMES PER DAY CAPSULE ORAL
Qty: 0 | Refills: 0 | Status: ACTIVE | COMMUNITY
Start: 1900-01-01

## 2020-12-17 RX ORDER — ABACAVIR SULFATE, LAMIVUDINE, AND ZIDOVUDINE 300; 150; 300 MG/1; MG/1; MG/1
TAKE 1 TABLET BY ORAL ROUTE 2 TIMES PER DAY TABLET, FILM COATED ORAL 2
Qty: 0 | Refills: 0 | Status: ACTIVE | COMMUNITY
Start: 1900-01-01

## 2020-12-17 RX ORDER — TRAZODONE HYDROCHLORIDE 100 MG/1
1 TABLET AT BEDTIME TABLET, FILM COATED ORAL ONCE A DAY
Status: ACTIVE | COMMUNITY

## 2020-12-17 RX ORDER — MIRTAZAPINE 7.5 MG/1
2 TABLETS AT BEDTIME TABLET, FILM COATED ORAL ONCE A DAY
Status: ACTIVE | COMMUNITY

## 2020-12-17 RX ORDER — ATORVASTATIN CALCIUM 20 MG/1
1 TABLET TABLET, FILM COATED ORAL ONCE A DAY
Status: ACTIVE | COMMUNITY

## 2020-12-17 RX ORDER — LAMIVUDINE AND ZIDOVUDINE 150; 300 MG/1; MG/1
AS DIRECTED TABLET, FILM COATED ORAL
Status: ACTIVE | COMMUNITY

## 2020-12-17 RX ORDER — SIMETHICONE 80 MG/1
1 TABLET AFTER MEALS AND AT BEDTIME AS NEEDED TABLET, CHEWABLE ORAL
Status: ACTIVE | COMMUNITY

## 2020-12-17 RX ORDER — INSULIN ASPART 100 [IU]/ML
AS DIRECTED INJECTION, SOLUTION INTRAVENOUS; SUBCUTANEOUS
Status: ACTIVE | COMMUNITY

## 2020-12-17 RX ORDER — FERROUS FUMARATE 324(106)MG
TAKE 1 TABLET BY ORAL ROUTE 3 TIMES A DAY TABLET ORAL
Qty: 0 | Refills: 0 | Status: ACTIVE | COMMUNITY
Start: 1900-01-01

## 2020-12-17 RX ORDER — INSULIN DETEMIR 100 [IU]/ML
AS DIRECTED INJECTION, SOLUTION SUBCUTANEOUS
Status: ACTIVE | COMMUNITY

## 2020-12-17 RX ORDER — OXYCODONE HYDROCHLORIDE 10 MG/1
1 TABLET AS NEEDED TABLET ORAL
Status: ACTIVE | COMMUNITY

## 2020-12-17 RX ORDER — LEVOTHYROXINE SODIUM 137 UG/1
1 TABLET IN THE MORNING ON AN EMPTY STOMACH TABLET ORAL ONCE A DAY
Status: ACTIVE | COMMUNITY

## 2020-12-17 NOTE — HPI-OTHER HISTORIES
The patient presents in follow-up after presenting 3/3/20 with left-sided abdominal pain and diarrhea w/ fecal incontinence. He was diagnosed with C diff colitis and tx'ed with vancomycin x 2 weeks. He was suppose to follow-up later in March, but I am seeing him for the first time today in follow-up.  On 3/3/20, the patient presented for a "colon infection." Associated symptoms were left-sided abdominal pain and diarrhea with fecal incontinence. Onset of diarrhea was 3-6 months ago. He had 3-5 BMs/day. They were watery unless he took a "stool hardener" 2 pills QHS. He denied bleeding and weight loss.   His last colon was 20 years ago, he stated. He also reported a kidney infection. He noted seeing Dr. Jhon Troy at [facility]. His HIV doctor was George Ko at [facility] (next appt was on Wed).   Physician Consult Note from [facility] 6/26/19 - note no diarrhea.  He had a history of alcohol indued pancreatitis/partial pancreatectomy and former polysubstance abuse.  3/4/20 GPP stool study positive for C diff by Toxin A/B gene and EIA Toxin A/B negative.  He was treated with vancomycin 125 mg QID x 2 weeks and he notes his diarrhea resolved.  On 5/12/20, he stated he had recurrent diarrhea starting 1 week ago.  He was having 2 watery BMs/day.  On 6/26/20, he said he had 2-3 hard or watery BMs/day without straining. The vancomycin 125 mg had significantly improved his bowel habit. He no longer had incontinence. He reported intermittent LE edema. He also reported excessive sweating.   On 7/23/20, he said he had 2 BMs/day without blood. A colon was done at [facility] he stated while he was a recent inpatient. It was normal he stated. He continued on vancomycin 125 mg QD. He continued on Creon with meals and snacks.   ADDENDUM 7/24/20:  [facility] D/C summary from admit from 7/6/20-7/10/20 reviewed. Admitted with BRBPR and iron deficiency anemia. B12 and folic acid normal. Hgb 7.1 to 8.4 on admit and increased to 10.9 with 1 unit blood. Continued on iron sulfate. CT 7/6/20 A/P w/o contrast - right lower lung opacities - inflammatory or infectious process. Moderate stool in the colon. Mild bladder wall thickening. Extensive anasarca. CXR 7/6/20 with right lung base atelectasis.  Trace left pleural effusion.  7/9/20 Colonoscopy by Dr. Parry - ascending DD, brown stool in the entire colon - fair prep.  No other finding.  Unable to retroflex in the vault - too small.  On 8/20/20, he said his bowel habit was good. He had 2-3 formed BMs/day. He denied watery diarrhea. He was off vancomycin. He continued on Creon 36k lipase 1 pill 5x/day with food. He continued on oral iron 1 pill QD.  Today, he notes a recurrence of diarrhea 3 weeks ago. He says he was prescribed an antidiarrheal 4 pills QHS which resolved his diarrhea. He has a formed BM QD. He takes Creon 1 pill at breakfast, 2 pills at lunchtime, and 2 pills at dinner.  He does snack between meals and does not take Creon for snacks.  Labs 8/20/20 - CBC normal except hgb 9.8. TIBC 211, ferritin 500. 6/26/20 - CBC normal except hgb 8.3, MCV 99, platelets 451. CMP normal except creatinine 1.75, potassium 5.3, albumin 3.2, AST 45, GFR 48. Ferritin 440, TIBC 229, . Folate, vit B12 all normal. 6/2/20 - Stool study positive for C. diff, neg for C. dif a/b EIA. 5/28/20 - CBC normal except hgb 8.1, . CMP, iron/TIBC, ferritin, TSH/FT4, vit B12, folate all normal. 3/2/20 - TSH 52.6/elevated, FT4 0.70 LLN 0.82, WBC 15, Hgb 8.3, MCV 97/normal, Creat 1.76, K 6.0, Albumin 3.0, ttG negative, lipase 3, IgA normal.

## 2020-12-24 LAB
C. DIFFICILE CHEK (GDH), STOOL - QDX: POSITIVE
C. DIFFICILE TOXIN A/B, STOOL - QDX: NEGATIVE
GASTROINTESTINAL PATHOGEN: (no result)

## 2020-12-31 ENCOUNTER — TELEPHONE ENCOUNTER (OUTPATIENT)
Dept: URBAN - METROPOLITAN AREA CLINIC 105 | Facility: CLINIC | Age: 60
End: 2020-12-31

## 2021-01-27 ENCOUNTER — OFFICE VISIT (OUTPATIENT)
Dept: URBAN - METROPOLITAN AREA CLINIC 105 | Facility: CLINIC | Age: 61
End: 2021-01-27

## 2021-01-27 RX ORDER — SIMETHICONE 80 MG/1
1 TABLET AFTER MEALS AND AT BEDTIME AS NEEDED TABLET, CHEWABLE ORAL
Status: ACTIVE | COMMUNITY

## 2021-01-27 RX ORDER — LEVOTHYROXINE SODIUM 137 UG/1
1 TABLET IN THE MORNING ON AN EMPTY STOMACH TABLET ORAL ONCE A DAY
Status: ACTIVE | COMMUNITY

## 2021-01-27 RX ORDER — MIRTAZAPINE 7.5 MG/1
2 TABLETS AT BEDTIME TABLET, FILM COATED ORAL ONCE A DAY
Status: ACTIVE | COMMUNITY

## 2021-01-27 RX ORDER — GABAPENTIN 300 MG/1
TAKE 1 CAPSULE (300 MG) BY ORAL ROUTE 3 TIMES PER DAY CAPSULE ORAL
Qty: 0 | Refills: 0 | Status: ACTIVE | COMMUNITY
Start: 1900-01-01

## 2021-01-27 RX ORDER — INSULIN DETEMIR 100 [IU]/ML
AS DIRECTED INJECTION, SOLUTION SUBCUTANEOUS
Status: ACTIVE | COMMUNITY

## 2021-01-27 RX ORDER — INSULIN ASPART 100 [IU]/ML
AS DIRECTED INJECTION, SOLUTION INTRAVENOUS; SUBCUTANEOUS
Status: ACTIVE | COMMUNITY

## 2021-01-27 RX ORDER — ATORVASTATIN CALCIUM 20 MG/1
1 TABLET TABLET, FILM COATED ORAL ONCE A DAY
Status: ACTIVE | COMMUNITY

## 2021-01-27 RX ORDER — VENLAFAXINE HYDROCHLORIDE 100 MG/1
1 TABLET WITH FOOD TABLET ORAL ONCE A DAY
Status: ACTIVE | COMMUNITY

## 2021-01-27 RX ORDER — TAMSULOSIN HYDROCHLORIDE 0.4 MG/1
CAPSULE ORAL
Qty: 0 | Refills: 0 | Status: ACTIVE | COMMUNITY
Start: 1900-01-01

## 2021-01-27 RX ORDER — LAMIVUDINE AND ZIDOVUDINE 150; 300 MG/1; MG/1
AS DIRECTED TABLET, FILM COATED ORAL
Status: ACTIVE | COMMUNITY

## 2021-01-27 RX ORDER — CHOLECALCIFEROL (VITAMIN D3) 1250 MCG
1 CAPSULE CAPSULE ORAL
Status: ACTIVE | COMMUNITY

## 2021-01-27 RX ORDER — CHLORDIAZEPOXIDE HCL 10 MG
TAKE 1 CAPSULE (10 MG) BY ORAL ROUTE 3 TIMES PER DAY CAPSULE ORAL
Qty: 0 | Refills: 0 | Status: ACTIVE | COMMUNITY
Start: 1900-01-01

## 2021-01-27 RX ORDER — ABACAVIR SULFATE, LAMIVUDINE, AND ZIDOVUDINE 300; 150; 300 MG/1; MG/1; MG/1
TAKE 1 TABLET BY ORAL ROUTE 2 TIMES PER DAY TABLET, FILM COATED ORAL 2
Qty: 0 | Refills: 0 | Status: ACTIVE | COMMUNITY
Start: 1900-01-01

## 2021-01-27 RX ORDER — PROMETHAZINE HYDROCHLORIDE 12.5 MG/1
1 TABLET AS NEEDED TABLET ORAL
Qty: 90 TABLET | Status: ACTIVE | COMMUNITY

## 2021-01-27 RX ORDER — OXYCODONE HYDROCHLORIDE 10 MG/1
1 TABLET AS NEEDED TABLET ORAL
Status: ACTIVE | COMMUNITY

## 2021-01-27 RX ORDER — FERROUS FUMARATE 324(106)MG
TAKE 1 TABLET BY ORAL ROUTE 3 TIMES A DAY TABLET ORAL
Qty: 0 | Refills: 0 | Status: ACTIVE | COMMUNITY
Start: 1900-01-01

## 2021-01-27 RX ORDER — TRAZODONE HYDROCHLORIDE 100 MG/1
1 TABLET AT BEDTIME TABLET, FILM COATED ORAL ONCE A DAY
Status: ACTIVE | COMMUNITY

## 2021-01-27 RX ORDER — LISINOPRIL 20 MG/1
TAKE 1 TABLET (20 MG) BY ORAL ROUTE ONCE DAILY TABLET ORAL 1
Qty: 0 | Refills: 0 | Status: ACTIVE | COMMUNITY
Start: 1900-01-01

## 2021-01-27 NOTE — HPI-OTHER HISTORIES
The patient presents in follow-up after presenting 3/3/20 with left-sided abdominal pain and diarrhea w/ fecal incontinence. He was diagnosed with C diff colitis and tx'ed with vancomycin x 2 weeks. He was suppose to follow-up later in March, but I am seeing him for the first time today in follow-up.  On 3/3/20, the patient presented for a "colon infection." Associated symptoms were left-sided abdominal pain and diarrhea with fecal incontinence. Onset of diarrhea was 3-6 months ago. He had 3-5 BMs/day. They were watery unless he took a "stool hardener" 2 pills QHS. He denied bleeding and weight loss.   His last colon was 20 years ago, he stated. He also reported a kidney infection. He noted seeing Dr. Jhon Troy at [facility]. His HIV doctor was George Ko at [facility] (next appt was on Wed).   Physician Consult Note from [facility] 6/26/19 - note no diarrhea.  He had a history of alcohol indued pancreatitis/partial pancreatectomy and former polysubstance abuse.  3/4/20 GPP stool study positive for C diff by Toxin A/B gene and EIA Toxin A/B negative.  He was treated with vancomycin 125 mg QID x 2 weeks and he notes his diarrhea resolved.  On 5/12/20, he stated he had recurrent diarrhea starting 1 week ago.  He was having 2 watery BMs/day.  On 6/26/20, he said he had 2-3 hard or watery BMs/day without straining. The vancomycin 125 mg had significantly improved his bowel habit. He no longer had incontinence. He reported intermittent LE edema. He also reported excessive sweating.   On 7/23/20, he said he had 2 BMs/day without blood. A colon was done at [facility] he stated while he was a recent inpatient. It was normal he stated. He continued on vancomycin 125 mg QD. He continued on Creon with meals and snacks.   ADDENDUM 7/24/20:  [facility] D/C summary from admit from 7/6/20-7/10/20 reviewed. Admitted with BRBPR and iron deficiency anemia. B12 and folic acid normal. Hgb 7.1 to 8.4 on admit and increased to 10.9 with 1 unit blood. Continued on iron sulfate. CT 7/6/20 A/P w/o contrast - right lower lung opacities - inflammatory or infectious process. Moderate stool in the colon. Mild bladder wall thickening. Extensive anasarca. CXR 7/6/20 with right lung base atelectasis.  Trace left pleural effusion.  7/9/20 Colonoscopy by Dr. Parry - ascending DD, brown stool in the entire colon - fair prep.  No other finding.  Unable to retroflex in the vault - too small.  On 8/20/20, he said his bowel habit was good. He had 2-3 formed BMs/day. He denied watery diarrhea. He was off vancomycin. He continued on Creon 36k lipase 1 pill 5x/day with food. He continued on oral iron 1 pill QD.  On 12/17/20, he noted a recurrence of diarrhea 3 weeks ago. He said he was prescribed an antidiarrheal 4 pills QHS which resolved his diarrhea. He had a formed BM QD. He took Creon 1 pill at breakfast, 2 pills at lunchtime, and 2 pills at dinner. He did snack between meals and did not take Creon for snacks.  Labs 12/17/20 - Stool study positive for C. diff. 8/20/20 - CBC normal except hgb 9.8. TIBC 211, ferritin 500. 6/26/20 - CBC normal except hgb 8.3, MCV 99, platelets 451. CMP normal except creatinine 1.75, potassium 5.3, albumin 3.2, AST 45, GFR 48. Ferritin 440, TIBC 229, . Folate, vit B12 all normal. 6/2/20 - Stool study positive for C. diff, neg for C. dif a/b EIA. 5/28/20 - CBC normal except hgb 8.1, . CMP, iron/TIBC, ferritin, TSH/FT4, vit B12, folate all normal. 3/2/20 - TSH 52.6/elevated, FT4 0.70 LLN 0.82, WBC 15, Hgb 8.3, MCV 97/normal, Creat 1.76, K 6.0, Albumin 3.0, ttG negative, lipase 3, IgA normal.

## 2021-02-12 ENCOUNTER — TELEPHONE ENCOUNTER (OUTPATIENT)
Dept: URBAN - METROPOLITAN AREA CLINIC 23 | Facility: CLINIC | Age: 61
End: 2021-02-12

## 2021-02-22 ENCOUNTER — DASHBOARD ENCOUNTERS (OUTPATIENT)
Age: 61
End: 2021-02-22

## 2021-03-10 ENCOUNTER — OFFICE VISIT (OUTPATIENT)
Dept: URBAN - METROPOLITAN AREA CLINIC 105 | Facility: CLINIC | Age: 61
End: 2021-03-10

## 2022-04-30 ENCOUNTER — TELEPHONE ENCOUNTER (OUTPATIENT)
Dept: URBAN - METROPOLITAN AREA CLINIC 121 | Facility: CLINIC | Age: 62
End: 2022-04-30

## 2022-05-01 ENCOUNTER — TELEPHONE ENCOUNTER (OUTPATIENT)
Dept: URBAN - METROPOLITAN AREA CLINIC 121 | Facility: CLINIC | Age: 62
End: 2022-05-01